# Patient Record
Sex: FEMALE | Race: WHITE | NOT HISPANIC OR LATINO | URBAN - METROPOLITAN AREA
[De-identification: names, ages, dates, MRNs, and addresses within clinical notes are randomized per-mention and may not be internally consistent; named-entity substitution may affect disease eponyms.]

---

## 2021-12-17 PROBLEM — Z00.00 ENCOUNTER FOR PREVENTIVE HEALTH EXAMINATION: Status: ACTIVE | Noted: 2021-12-17

## 2025-05-16 ENCOUNTER — OUTPATIENT (OUTPATIENT)
Dept: OUTPATIENT SERVICES | Facility: HOSPITAL | Age: 65
LOS: 1 days | End: 2025-05-16
Payer: MEDICARE

## 2025-05-16 ENCOUNTER — TRANSCRIPTION ENCOUNTER (OUTPATIENT)
Age: 65
End: 2025-05-16

## 2025-05-16 VITALS
OXYGEN SATURATION: 100 % | HEIGHT: 67 IN | WEIGHT: 153.44 LBS | TEMPERATURE: 98 F | DIASTOLIC BLOOD PRESSURE: 68 MMHG | HEART RATE: 73 BPM | RESPIRATION RATE: 18 BRPM | SYSTOLIC BLOOD PRESSURE: 126 MMHG

## 2025-05-16 VITALS
DIASTOLIC BLOOD PRESSURE: 90 MMHG | HEART RATE: 81 BPM | RESPIRATION RATE: 18 BRPM | OXYGEN SATURATION: 100 % | SYSTOLIC BLOOD PRESSURE: 124 MMHG

## 2025-05-16 DIAGNOSIS — Z87.81 PERSONAL HISTORY OF (HEALED) TRAUMATIC FRACTURE: Chronic | ICD-10-CM

## 2025-05-16 DIAGNOSIS — Z98.891 HISTORY OF UTERINE SCAR FROM PREVIOUS SURGERY: Chronic | ICD-10-CM

## 2025-05-16 DIAGNOSIS — Z98.890 OTHER SPECIFIED POSTPROCEDURAL STATES: Chronic | ICD-10-CM

## 2025-05-16 DIAGNOSIS — Z90.722 ACQUIRED ABSENCE OF OVARIES, BILATERAL: Chronic | ICD-10-CM

## 2025-05-16 PROCEDURE — 67041 VIT FOR MACULAR PUCKER: CPT | Mod: RT

## 2025-05-16 PROCEDURE — 67041 VIT FOR MACULAR PUCKER: CPT | Mod: AS,LT

## 2025-05-16 RX ORDER — CALCIUM CARBONATE/VITAMIN D3 500MG-5MCG
2 TABLET ORAL
Refills: 0 | DISCHARGE

## 2025-05-16 RX ORDER — ERGOCALCIFEROL 1.25 MG/1
1 CAPSULE ORAL
Refills: 0 | DISCHARGE

## 2025-05-16 NOTE — ASU DISCHARGE PLAN (ADULT/PEDIATRIC) - FINANCIAL ASSISTANCE
[facility] provides services at a reduced cost to those who are determined to be eligible through [facility]’s financial assistance program. Information regarding [facility]’s financial assistance program can be found by going to [email or web address] or by calling [phone].

## 2025-05-16 NOTE — ASU DISCHARGE PLAN (ADULT/PEDIATRIC) - ASU DC SPECIAL INSTRUCTIONSFT
Continue Home Medication Regimen as per your Primary Care Provider    Maintain Face down positioning    Follow up in the Retina Surgeon's office tomorrow

## 2025-05-16 NOTE — ASU PATIENT PROFILE, ADULT - HEALTHCARE INFORMATION NEEDED, PROFILE
- Currently on metformin 1000 mg extended release twice daily with meals; prior authorization for metformin will be initiated, as patient experienced severe GI symptoms and hypoglycemic episodes.  - We discussed initiating a GLP-1 receptor agonist for both weight loss and improved glycemic control. The patient is ready to start Mounjaro; a prior authorization has been submitted.  - An A1c test was conducted today; it is elevated at 8.2.  Plan  - Patient is ready to start Mounjaro, prior authorization sent  - Counseling was provided regarding potential side effects, particularly nausea, which is common with GLP-1 medications. She was advised to eat small, frequent meals and to avoid high-fat foods to help minimize symptoms.  - Follow-up on Lipid, CBC, CMP, evaluate LFTs and calculate FIB-4 Score.     Orders:    MetFORMIN ER (GLUMETZA) 1000 MG modified release 24 hr tablet; Take 1 tablet by mouth in the morning and 1 tablet in the evening. Take with meals.    tirzepatide (Mounjaro) 2.5 MG/0.5ML Solution Auto-injector; Inject 2.5 mg into the skin every 7 days for 28 days. Indications: Type 2 Diabetes    tirzepatide (Mounjaro) 5 MG/0.5ML Solution Auto-injector; Inject 5 mg into the skin every 7 days. Indications: Type 2 Diabetes Begin taking on May 20, 2025. Start after finishing 2.5 mg prescription.    POCT Glycohemoglobin Analyzer    Comprehensive Metabolic Panel; Future    CBC with Automated Differential; Future     Left Lumpectomy, pt denies any arm restrictions, states it was 25 yrs ago.

## 2025-05-16 NOTE — ASU DISCHARGE PLAN (ADULT/PEDIATRIC) - CARE PROVIDER_API CALL
Enoc Calvillo  Ophthalmology  [address]  [place] [address]  Phone: [phone]  Fax: [phone]  Scheduled Appointment: 05/17/2025 08:45 AM

## 2025-05-16 NOTE — ASU PATIENT PROFILE, ADULT - NSICDXPASTSURGICALHX_GEN_ALL_CORE_FT
PAST SURGICAL HISTORY:  History of basal cell carcinoma (BCC) excision Face    History of bilateral oophorectomy     History of      History of lumpectomy of left breast     History of radius fracture left    S/P thyroid biopsy

## 2025-05-16 NOTE — ASU PATIENT PROFILE, ADULT - FALL HARM RISK - HARM RISK INTERVENTIONS

## (undated) DEVICE — SUT VICRYL 7-0 12" TG140-8 DA

## (undated) DEVICE — SYE-LASER - CONSTELLATION MACHINE #2 1003466901X: Type: DURABLE MEDICAL EQUIPMENT

## (undated) DEVICE — SOL BALANCE SALT 15ML

## (undated) DEVICE — MARKER OPTH STR VISIMARK VIO

## (undated) DEVICE — FLEXLOOP CURVED SCRAPER MEMBRANE 25G

## (undated) DEVICE — KNIFE ALCON MVR V-LANCE 23G (BLACK)

## (undated) DEVICE — BACKFLUSH SOFT TIP 25G

## (undated) DEVICE — PACK VITRECTOMY

## (undated) DEVICE — CANNULA ALCON SOFT TIP 25G

## (undated) DEVICE — ELCTR BIPOLAR CORD J&J 12FT DISP

## (undated) DEVICE — DIATHERMY PROBE 25GA

## (undated) DEVICE — ILM FORCEP 23G

## (undated) DEVICE — SOL IRR BAL SALT + 500ML

## (undated) DEVICE — SCRAPER MEMBRANE 23G

## (undated) DEVICE — VENODYNE/SCD SLEEVE CALF MEDIUM

## (undated) DEVICE — PACK CONSTELLATION POST 25G 20K

## (undated) DEVICE — WARMING BLANKET LOWER ADULT

## (undated) DEVICE — DRAPE MICROSCOPE RESIGHT

## (undated) DEVICE — ILM FORCEP 25G

## (undated) DEVICE — CONSTELLATION TOTAL PLUS PAK 23G

## (undated) DEVICE — CANNULA ALCON SOFT TIP 23G

## (undated) DEVICE — DRAPE OPHTHALMIC W POUCH

## (undated) DEVICE — GLV 8 PROTEXIS (WHITE)